# Patient Record
Sex: MALE | Race: WHITE | Employment: UNEMPLOYED | URBAN - METROPOLITAN AREA
[De-identification: names, ages, dates, MRNs, and addresses within clinical notes are randomized per-mention and may not be internally consistent; named-entity substitution may affect disease eponyms.]

---

## 2022-10-25 ENCOUNTER — APPOINTMENT (OUTPATIENT)
Dept: GENERAL RADIOLOGY | Age: 62
End: 2022-10-25

## 2022-10-25 ENCOUNTER — HOSPITAL ENCOUNTER (EMERGENCY)
Age: 62
Discharge: HOME | End: 2022-10-25
Attending: EMERGENCY MEDICINE

## 2022-10-25 VITALS
HEIGHT: 71 IN | SYSTOLIC BLOOD PRESSURE: 118 MMHG | RESPIRATION RATE: 12 BRPM | BODY MASS INDEX: 19.98 KG/M2 | TEMPERATURE: 97.4 F | WEIGHT: 142.7 LBS | DIASTOLIC BLOOD PRESSURE: 70 MMHG | HEART RATE: 81 BPM | OXYGEN SATURATION: 97 %

## 2022-10-25 DIAGNOSIS — Z48.02 VISIT FOR SUTURE REMOVAL: Primary | ICD-10-CM

## 2022-10-25 DIAGNOSIS — L85.3 DRY SKIN: ICD-10-CM

## 2022-10-25 DIAGNOSIS — S42.92XD: ICD-10-CM

## 2022-10-25 PROCEDURE — 6370000000 HC RX 637 (ALT 250 FOR IP): Performed by: EMERGENCY MEDICINE

## 2022-10-25 PROCEDURE — 99283 EMERGENCY DEPT VISIT LOW MDM: CPT

## 2022-10-25 PROCEDURE — 73030 X-RAY EXAM OF SHOULDER: CPT

## 2022-10-25 RX ORDER — PETROLATUM 42 G/100G
OINTMENT TOPICAL
Qty: 454 G | Refills: 0 | Status: SHIPPED | OUTPATIENT
Start: 2022-10-25

## 2022-10-25 RX ORDER — VILAZODONE HYDROCHLORIDE 40 MG/1
40 TABLET ORAL DAILY
COMMUNITY

## 2022-10-25 RX ORDER — ACETAMINOPHEN 500 MG
1000 TABLET ORAL ONCE
Status: COMPLETED | OUTPATIENT
Start: 2022-10-25 | End: 2022-10-25

## 2022-10-25 RX ADMIN — ACETAMINOPHEN 1000 MG: 500 TABLET ORAL at 10:28

## 2022-10-25 NOTE — ED PROVIDER NOTES
[address] [facility]    CHIEF COMPLAINT  Suture / Staple Removal (Stitches placed to forehead at [facility] \"about 10 days ago. \"), Shoulder Pain (Pt states he fell out of bed last week. Left shoulder pain.), and Rash (Pt with flaky thick skin to forehead and NL folds.)       HISTORY OF PRESENT ILLNESS  Jaymie Matt is a 60 y.o. male who presents to the ED for suture removal.  Patient reports sutures were placed at [facility] approximately 10 days ago. He also states he has left shoulder pain. He thinks it was dislocated at that time and that they reduced it. He thinks he had some x-rays obtained at the outside facility but is not sure. Overall the patient is a poor historian. He denies any recent trauma since the assessment in the emergency department he reports occurred a week and a half ago. The pain in the left shoulder is moderate, worse with movement, better with rest, no radiation, no associated symptoms. He thinks they gave him a sling during his emergency department visit but he is not certain. No other complaints, modifying factors or associated symptoms. I have reviewed the following from the nursing documentation:    Past Medical History:   Diagnosis Date    Depression     Thyroid disease      Past Surgical History:   Procedure Laterality Date    FEMUR FRACTURE SURGERY       History reviewed. No pertinent family history.   Social History     Socioeconomic History    Marital status: Single     Spouse name: Not on file    Number of children: Not on file    Years of education: Not on file    Highest education level: Not on file   Occupational History    Not on file   Tobacco Use    Smoking status: Every Day     Types: Cigarettes    Smokeless tobacco: Not on file   Substance and Sexual Activity    Alcohol use: Not Currently    Drug use: Not Currently    Sexual activity: Not on file   Other Topics Concern    Not on file   Social History Narrative    Not on file     Social Determinants of Health     Financial Resource Strain: Not on file   Food Insecurity: Not on file   Transportation Needs: Not on file   Physical Activity: Not on file   Stress: Not on file   Social Connections: Not on file   Intimate Partner Violence: Not on file   Housing Stability: Not on file     No current facility-administered medications for this encounter. Current Outpatient Medications   Medication Sig Dispense Refill    vilazodone hcl 40 MG TABS Take 40 mg by mouth daily      OMEPRAZOLE PO Take by mouth      BUPROPION HBR ER PO Take by mouth      LEVOTHYROXINE SODIUM PO Take by mouth      mineral oil-hydrophilic petrolatum (HYDROPHOR) ointment Apply topically as needed. 454 g 0     No Known Allergies    REVIEW OF SYSTEMS  10 systems reviewed, pertinent positives and negatives per HPI, otherwise noted to be negative. PHYSICAL EXAM  ED Triage Vitals [10/25/22 0933]   BP Temp Temp Source Heart Rate Resp SpO2 Height Weight   118/70 97.4 °F (36.3 °C) Oral 81 12 97 % 5' 11\" (1.803 m) 142 lb 11.2 oz (64.7 kg)     General appearance: Awake and alert. Cooperative. No acute distress. HENT: Normocephalic. There is a well-healing forehead laceration with sutures in place, clean/dry/intact. Mucous membranes are moist.  Neck: Supple. Eyes: PERRL. EOMI. Heart/Chest: RRR. No murmurs. Lungs: Respirations unlabored. CTAB. Good air exchange. Speaking comfortably in full sentences. Abdomen: Soft. Non-tender. Non-distended. No rebound or guarding. Musculoskeletal: No extremity edema. No deformity. Mild diffuse tenderness to palpation of the left shoulder with full range of motion. Left cardinal hand functions are intact. Sensation is intact R/M/U.  2+ radial pulse. No other tenderness in the extremities. Skin: Warm and dry. Dry skin particular affecting the face. Neurological: Alert and oriented. CN II-XII intact. Strength 5/5 bilateral upper and lower extremities. Sensation intact to light touch.  Gait normal.  Psychiatric: Mood/affect: normal      LABS  I have reviewed all labs for this visit. No results found for this visit on 10/25/22. RADIOLOGY  I have reviewed all radiographic studies for this visit. XR SHOULDER LEFT (MIN 2 VIEWS)   Final Result      3 views demonstrate comminuted, depressed fracture involving the lateral aspect of the humeral head. Given history of recent shoulder dislocation, this is most compatible with Hill-Sachs deformity. 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 The shoulder is normally located at this time. ED COURSE/MDM  Patient seen and evaluated. Old records reviewed. Labs and imaging reviewed and results discussed with patient/family to extent possible. 79-year-old male presents for suture removal.  5 sutures removed without complication as below. Also complains of left shoulder pain. Likely from recent trauma. No new recent trauma. Given patient is a poor historian will obtain an x-ray of the left shoulder. The limb is neurovascularly intact. Pt also complains of dry skin for which the patient will be prescribed Aquaphor. X-ray of the left shoulder show comminuted, depressed fracture of the lateral aspect of the humeral head consistent with a Hill-Sachs deformity given the recent report of shoulder dislocation. This is a closed injury and the patient is neurovascularly intact. Will place patient in sling. We will have patient follow with orthopedics in the outpatient setting. Usual strict return precautions communicated. Is this patient to be included in the SEP-1 Core Measure? No   Exclusion criteria - the patient is NOT to be included for SEP-1 Core Measure due to: Infection is not suspected    ISilvio MD, am the primary clinician of record.      During the patient's ED course, the patient was given:  Medications   acetaminophen (TYLENOL) tablet 1,000 mg (1,000 mg Oral Given 10/25/22 1028)        All questions were answered and the patient/family expressed understanding and agreement with the plan. PROCEDURES  None    CRITICAL CARE  N/A    CLINICAL IMPRESSION  1. Visit for suture removal    2. Closed fracture dislocation of left shoulder joint, with routine healing, subsequent encounter    3. Dry skin        DISPOSITION   discharge     Aniket Carpio MD    Note: This chart was created using voice recognition dictation software. Efforts were made by me to ensure accuracy, however some errors may be present due to limitations of this technology and occasionally words are not transcribed correctly.         Aniket Carpio MD  10/25/22 8254

## 2022-10-25 NOTE — DISCHARGE INSTRUCTIONS
Keep the left arm in the sling. Follow up with Orthopedics (Dr. Jenaro Wild) at the number provided. Return for any new or worsening symptoms, pus from wound, fever, weakness, numbness, or discoloration of the left upper extremity, or for any other concerns. Aquaphor, as needed, for dry skin.

## 2022-12-19 ENCOUNTER — TELEPHONE (OUTPATIENT)
Dept: ORTHOPEDIC SURGERY | Age: 62
End: 2022-12-19

## 2022-12-19 NOTE — TELEPHONE ENCOUNTER
Patient has not seen Dr Merna Bloch, would need appointment    jm
SPOKE WITH AMIRA AT [facility]. THEY NEED A COPY OF REFERRAL SHEET TO SEE THIS PATIENT.  FAX NUMBER [phone]
THEY ALSO ASKED FOR COPY/PROOF OF INSURANCE WHICH IS NOT IN THE CHART.
yes
Agree

## 2022-12-20 ENCOUNTER — HOSPITAL ENCOUNTER (OUTPATIENT)
Dept: PHYSICAL THERAPY | Age: 62
Setting detail: THERAPIES SERIES
Discharge: HOME | End: 2022-12-20

## 2022-12-20 NOTE — FLOWSHEET NOTE
[facility] [address], [address] [facility], [address] [place], [place]      Physical Therapy  Cancellation/No-show Note  Patient Name:  Jaymie Matt  :  1960   Date:  2022  Cancelled visits to date: 0  No-shows to date: 1    For today's appointment patient:  []  Cancelled  []  Rescheduled appointment  [x]  No-show     Reason given by patient:  []  Patient ill  []  Conflicting appointment  []  No transportation    []  Conflict with work  [x]  No reason given  []  Other:     Comments:      Electronically signed by:  Uziel Garcia, PT

## 2025-08-11 ENCOUNTER — HOSPITAL ENCOUNTER (EMERGENCY)
Age: 65
Discharge: NURSING, REHAB OR LONG-TERM CARE | End: 2025-08-11
Attending: STUDENT IN AN ORGANIZED HEALTH CARE EDUCATION/TRAINING PROGRAM

## 2025-08-11 VITALS
WEIGHT: 131.7 LBS | SYSTOLIC BLOOD PRESSURE: 108 MMHG | HEIGHT: 71 IN | TEMPERATURE: 98.3 F | DIASTOLIC BLOOD PRESSURE: 58 MMHG | RESPIRATION RATE: 21 BRPM | BODY MASS INDEX: 18.44 KG/M2 | OXYGEN SATURATION: 95 % | HEART RATE: 85 BPM

## 2025-08-11 DIAGNOSIS — D61.818 PANCYTOPENIA (HCC): Primary | ICD-10-CM

## 2025-08-11 PROBLEM — E44.0 MODERATE PROTEIN-CALORIE MALNUTRITION: Chronic | Status: ACTIVE | Noted: 2025-06-05

## 2025-08-11 PROBLEM — M75.122 NONTRAUMATIC COMPLETE TEAR OF LEFT ROTATOR CUFF: Status: ACTIVE | Noted: 2023-06-07

## 2025-08-11 PROBLEM — K76.6 PORTAL HYPERTENSIVE GASTROPATHY (HCC): Chronic | Status: ACTIVE | Noted: 2025-06-07

## 2025-08-11 PROBLEM — R60.0 BILATERAL LOWER EXTREMITY EDEMA: Status: ACTIVE | Noted: 2025-06-05

## 2025-08-11 PROBLEM — R06.09 DYSPNEA ON EXERTION: Status: ACTIVE | Noted: 2024-09-19

## 2025-08-11 PROBLEM — F33.2 MAJOR DEPRESSIVE DISORDER, RECURRENT SEVERE WITHOUT PSYCHOTIC FEATURES (HCC): Chronic | Status: ACTIVE | Noted: 2022-10-19

## 2025-08-11 PROBLEM — K31.89 PORTAL HYPERTENSIVE GASTROPATHY (HCC): Chronic | Status: ACTIVE | Noted: 2025-06-07

## 2025-08-11 PROBLEM — C22.0 HEPATOCELLULAR CARCINOMA (HCC): Chronic | Status: ACTIVE | Noted: 2025-06-06

## 2025-08-11 PROBLEM — D53.9 MACROCYTIC ANEMIA: Status: ACTIVE | Noted: 2025-06-04

## 2025-08-11 PROBLEM — F41.1 GENERALIZED ANXIETY DISORDER: Chronic | Status: ACTIVE | Noted: 2022-10-19

## 2025-08-11 PROBLEM — M70.62 GREATER TROCHANTERIC BURSITIS OF LEFT HIP: Status: ACTIVE | Noted: 2023-05-09

## 2025-08-11 PROBLEM — I77.811 ABDOMINAL AORTIC ECTASIA: Chronic | Status: ACTIVE | Noted: 2025-08-11

## 2025-08-11 PROBLEM — K70.30 ALCOHOLIC CIRRHOSIS OF LIVER WITHOUT ASCITES (HCC): Chronic | Status: ACTIVE | Noted: 2025-06-05

## 2025-08-11 PROBLEM — I25.118 CORONARY ARTERY DISEASE OF NATIVE ARTERY OF NATIVE HEART WITH STABLE ANGINA PECTORIS: Chronic | Status: ACTIVE | Noted: 2024-09-19

## 2025-08-11 LAB
ANION GAP SERPL CALCULATED.3IONS-SCNC: 9 MMOL/L (ref 3–16)
BASOPHILS # BLD: 0 K/UL (ref 0–0.2)
BASOPHILS NFR BLD: 0.6 %
BUN SERPL-MCNC: 7 MG/DL (ref 7–20)
CALCIUM SERPL-MCNC: 8.1 MG/DL (ref 8.3–10.6)
CHLORIDE SERPL-SCNC: 101 MMOL/L (ref 99–110)
CO2 SERPL-SCNC: 26 MMOL/L (ref 21–32)
CREAT SERPL-MCNC: 0.5 MG/DL (ref 0.8–1.3)
DEPRECATED RDW RBC AUTO: 26.3 % (ref 12.4–15.4)
EOSINOPHIL # BLD: 0 K/UL (ref 0–0.6)
EOSINOPHIL NFR BLD: 2.9 %
GFR SERPLBLD CREATININE-BSD FMLA CKD-EPI: >90 ML/MIN/{1.73_M2}
GLUCOSE SERPL-MCNC: 107 MG/DL (ref 70–99)
HCT VFR BLD AUTO: 29.7 % (ref 40.5–52.5)
HGB BLD-MCNC: 9.9 G/DL (ref 13.5–17.5)
LYMPHOCYTES # BLD: 0.3 K/UL (ref 1–5.1)
LYMPHOCYTES NFR BLD: 20.1 %
MCH RBC QN AUTO: 27.4 PG (ref 26–34)
MCHC RBC AUTO-ENTMCNC: 33.3 G/DL (ref 31–36)
MCV RBC AUTO: 82.3 FL (ref 80–100)
MONOCYTES # BLD: 0.3 K/UL (ref 0–1.3)
MONOCYTES NFR BLD: 16.9 %
NEUTROPHILS # BLD: 1 K/UL (ref 1.7–7.7)
NEUTROPHILS NFR BLD: 59.5 %
PLATELET # BLD AUTO: 95 K/UL (ref 135–450)
PMV BLD AUTO: 7.3 FL (ref 5–10.5)
POTASSIUM SERPL-SCNC: 3.8 MMOL/L (ref 3.5–5.1)
RBC # BLD AUTO: 3.61 M/UL (ref 4.2–5.9)
SODIUM SERPL-SCNC: 136 MMOL/L (ref 136–145)
WBC # BLD AUTO: 1.6 K/UL (ref 4–11)

## 2025-08-11 PROCEDURE — 80048 BASIC METABOLIC PNL TOTAL CA: CPT

## 2025-08-11 PROCEDURE — 99283 EMERGENCY DEPT VISIT LOW MDM: CPT

## 2025-08-11 PROCEDURE — 85025 COMPLETE CBC W/AUTO DIFF WBC: CPT

## 2025-08-11 PROCEDURE — 36415 COLL VENOUS BLD VENIPUNCTURE: CPT

## 2025-08-11 ASSESSMENT — ENCOUNTER SYMPTOMS
PHOTOPHOBIA: 0
COUGH: 0
SHORTNESS OF BREATH: 0
NAUSEA: 0
VOMITING: 0

## 2025-08-11 ASSESSMENT — PAIN - FUNCTIONAL ASSESSMENT: PAIN_FUNCTIONAL_ASSESSMENT: 0-10

## 2025-08-11 ASSESSMENT — PAIN SCALES - GENERAL: PAINLEVEL_OUTOF10: 0
